# Patient Record
Sex: FEMALE | URBAN - METROPOLITAN AREA
[De-identification: names, ages, dates, MRNs, and addresses within clinical notes are randomized per-mention and may not be internally consistent; named-entity substitution may affect disease eponyms.]

---

## 2024-05-21 ENCOUNTER — OFFICE VISIT (OUTPATIENT)
Dept: URBAN - METROPOLITAN AREA CLINIC 96 | Facility: CLINIC | Age: 39
End: 2024-05-21

## 2024-06-21 ENCOUNTER — OFFICE VISIT (OUTPATIENT)
Dept: URBAN - METROPOLITAN AREA CLINIC 105 | Facility: CLINIC | Age: 39
End: 2024-06-21
Payer: COMMERCIAL

## 2024-06-21 ENCOUNTER — OFFICE VISIT (OUTPATIENT)
Dept: URBAN - METROPOLITAN AREA CLINIC 105 | Facility: CLINIC | Age: 39
End: 2024-06-21

## 2024-06-21 VITALS
BODY MASS INDEX: 23.25 KG/M2 | TEMPERATURE: 97.9 F | HEIGHT: 63 IN | HEART RATE: 55 BPM | SYSTOLIC BLOOD PRESSURE: 126 MMHG | WEIGHT: 131.2 LBS | DIASTOLIC BLOOD PRESSURE: 80 MMHG

## 2024-06-21 DIAGNOSIS — E73.9 LACTOSE INTOLERANCE: ICD-10-CM

## 2024-06-21 DIAGNOSIS — R10.11 RIGHT UPPER QUADRANT PAIN: ICD-10-CM

## 2024-06-21 DIAGNOSIS — R10.13 EPIGASTRIC BURNING SENSATION: ICD-10-CM

## 2024-06-21 DIAGNOSIS — R10.12 LEFT UPPER QUADRANT PAIN: ICD-10-CM

## 2024-06-21 DIAGNOSIS — R11.2 NAUSEA AND VOMITING, UNSPECIFIED VOMITING TYPE: ICD-10-CM

## 2024-06-21 DIAGNOSIS — R14.0 BLOATING: ICD-10-CM

## 2024-06-21 DIAGNOSIS — Z87.19 HISTORY OF CONSTIPATION: ICD-10-CM

## 2024-06-21 DIAGNOSIS — K21.9 GASTROESOPHAGEAL REFLUX DISEASE, UNSPECIFIED WHETHER ESOPHAGITIS PRESENT: ICD-10-CM

## 2024-06-21 PROBLEM — 235595009: Status: ACTIVE | Noted: 2024-06-21

## 2024-06-21 PROCEDURE — 99204 OFFICE O/P NEW MOD 45 MIN: CPT | Performed by: INTERNAL MEDICINE

## 2024-06-21 RX ORDER — OMEPRAZOLE 40 MG/1
1 CAPSULE 30 MINUTES BEFORE MORNING MEAL CAPSULE, DELAYED RELEASE ORAL ONCE A DAY
Qty: 30 | Refills: 2 | OUTPATIENT
Start: 2024-06-21

## 2024-06-21 RX ORDER — ESOMEPRAZOLE MAGNESIUM 20 MG/1
1 CAPSULE CAPSULE, DELAYED RELEASE ORAL ONCE A DAY
Status: ACTIVE | COMMUNITY

## 2024-06-21 RX ORDER — AMITRIPTYLINE HYDROCHLORIDE 10 MG/1
1 TABLET AT BEDTIME TABLET, FILM COATED ORAL ONCE A DAY
Qty: 30 | Refills: 2 | OUTPATIENT
Start: 2024-06-21

## 2024-06-21 RX ORDER — ONDANSETRON 4 MG/1
1 TABLET ON THE TONGUE AND ALLOW TO DISSOLVE TABLET, ORALLY DISINTEGRATING ORAL
Qty: 30 | Refills: 5 | OUTPATIENT
Start: 2024-06-21

## 2024-06-21 RX ORDER — DEXTROAMPHETAMINE SACCHARATE, AMPHETAMINE ASPARTATE MONOHYDRATE, DEXTROAMPHETAMINE SULFATE, AND AMPHETAMINE SULFATE 5; 5; 5; 5 MG/1; MG/1; MG/1; MG/1
TAKE 1 CAPSULE BY MOUTH EVERY MORNING CAPSULE, EXTENDED RELEASE ORAL
Qty: 30 EACH | Refills: 0 | Status: ACTIVE | COMMUNITY

## 2024-06-21 RX ORDER — ELETRIPTAN HYDROBROMIDE 40 MG/1
TAKE 1 TABLET BY MOUTH EVERY DAY AS NEEDED FOR MIGRAINE HEADACHE. MAY REPEAT DOSE ONCE IN HOURS TABLET, FILM COATED ORAL
Qty: 6 EACH | Refills: 0 | Status: ACTIVE | COMMUNITY

## 2024-06-21 RX ORDER — LORATADINE 10 MG
1 PACKET MIXED WITH 8 OUNCES OF FLUID TABLET,DISINTEGRATING ORAL ONCE A DAY
Status: ACTIVE | COMMUNITY

## 2024-06-21 RX ORDER — MAGNESIUM OXIDE/MAG AA CHELATE 300 MG
1 CAPSULE WITH A MEAL CAPSULE ORAL ONCE A DAY
Status: ACTIVE | COMMUNITY

## 2024-06-21 RX ORDER — VALACYCLOVIR 1 G/1
TAKE 2 TABLETS BY MOUTH TWICE A DAY FOR 3 DAYS. THEN AS NEEDED FOR 1 DAY TABLET, FILM COATED ORAL
Qty: 12 EACH | Refills: 0 | Status: ACTIVE | COMMUNITY

## 2024-06-21 RX ORDER — MULTIVITAMIN
1 TABLET TABLET ORAL ONCE A DAY
Status: ACTIVE | COMMUNITY

## 2024-06-21 RX ORDER — DEXTROAMPHETAMINE SACCHARATE, AMPHETAMINE ASPARTATE, DEXTROAMPHETAMINE SULFATE, AND AMPHETAMINE SULFATE 5; 5; 5; 5 MG/1; MG/1; MG/1; MG/1
1 TABLET TABLET ORAL TWICE A DAY
Status: ACTIVE | COMMUNITY

## 2024-06-21 RX ORDER — HYOSCYAMINE SULFATE 0.12 MG/1
1 -2 TABLETS TABLET ORAL
Qty: 30 | Refills: 2 | OUTPATIENT
Start: 2024-06-21 | End: 2024-09-19

## 2024-06-21 NOTE — HPI-TODAY'S VISIT:
Today, the patient presents with a long history of GI symptoms. She's seen two previous GIs - one at [facility] when she was in her mid 20s, then Dr. Singh, but now establishing with me after insurance change. She reports predominantly an epigastric burning sensation and occasional RUQ, LUQ pain. She can wake up in the middle of the night with pain if she does not take TUMS or Nexium prior and can also start having pain after urinating in the middle of the night. She has pain 5 days/week. An issue since she was a child. For the pain, she took Gas-x in the late 90s/early 2000s, has tried limiting diet and alcohol, and takes Nexium and TUMS. She takes Nexium 20 mg at bedtime 5-7 days/week - usually takes it if she's eaten after 6 pm. She has been on Nexium for 2-3 years. The pain can last hours if she does not take something. TUMS occasionally helps. Dr. Singh had prescribed hyoscyamine - unsure whether it helped. She says she can also go weeks without symptoms. She has heartburn 1x/few weeks, but no regurgitation. She also wakes up in the middle of the night w/ hypersalivation & nausea followed by emesis. These nausea/vomiting episodes happen 1x/few weeks, only in the middle of the night. She noted some benefit with ondansetron. She avoids some dairy, does fine w/ yogurt. Also avoids red meat, cauliflower, broccoli. She feels bloated.  She has 1-2 BMs/day w/ good evac. She has a loose BM QAM after coffee. No constipation. She takes Miralax 1 cap QAM, as recommended by Dr. Singh for mild constipation. At the time, she would go 2 days w/o a BM w/ occasional strain, but denies having incomplete evac. She's been on Miralax for 2-4 years.  She followed with Dr. Singh between from 2020 to 2022. The first GI, at [facility], did an EGD/colon when she was age 28/29 - polyp and gastric ulcer found. She also had an EGD with Dr. Singh in Nov 2022 which was normal. Celiac testing done. She says she's been previously dx'd with GERD, IBS, and "unspecified colitis." She had an abdominal ultrasound in 2016 - normal. Last labs were in the Fall.  Other PMH: migraines, ADHD, oral herpes

## 2024-06-22 ENCOUNTER — DASHBOARD ENCOUNTERS (OUTPATIENT)
Age: 39
End: 2024-06-22

## 2024-06-22 PROBLEM — 782415009: Status: ACTIVE | Noted: 2024-06-22

## 2024-06-26 ENCOUNTER — ERX REFILL RESPONSE (OUTPATIENT)
Dept: URBAN - METROPOLITAN AREA CLINIC 105 | Facility: CLINIC | Age: 39
End: 2024-06-26

## 2024-06-26 RX ORDER — HYOSCYAMINE SULFATE 0.12 MG/1
TAKE 1 TO 2 TABLETS ORALLY EVERY 6 HOURS PRN ABDOMINAL PAIN 30 DAYS TABLET ORAL
Qty: 90 TABLET | Refills: 1 | OUTPATIENT

## 2024-06-26 RX ORDER — HYOSCYAMINE SULFATE 0.12 MG/1
1 -2 TABLETS TABLET ORAL
Qty: 30 | Refills: 2 | OUTPATIENT

## 2024-07-12 ENCOUNTER — ERX REFILL RESPONSE (OUTPATIENT)
Dept: URBAN - METROPOLITAN AREA CLINIC 105 | Facility: CLINIC | Age: 39
End: 2024-07-12

## 2024-07-12 RX ORDER — HYOSCYAMINE SULFATE 0.12 MG/1
TAKE 1 TO 2 TABLETS ORALLY EVERY 6 HOURS PRN ABDOMINAL PAIN TABLET ORAL
Qty: 90 | Refills: 1 | OUTPATIENT

## 2024-07-12 RX ORDER — HYOSCYAMINE SULFATE 0.12 MG/1
TAKE 1 TO 2 TABLETS ORALLY EVERY 6 HOURS PRN ABDOMINAL PAIN 30 DAYS TABLET ORAL
Qty: 90 TABLET | Refills: 1 | OUTPATIENT

## 2024-07-17 ENCOUNTER — ERX REFILL RESPONSE (OUTPATIENT)
Dept: URBAN - METROPOLITAN AREA CLINIC 105 | Facility: CLINIC | Age: 39
End: 2024-07-17

## 2024-07-17 RX ORDER — OMEPRAZOLE 40 MG/1
1 CAPSULE 30 MINUTES BEFORE MORNING MEAL CAPSULE, DELAYED RELEASE ORAL ONCE A DAY
Qty: 30 | Refills: 2 | OUTPATIENT

## 2024-07-17 RX ORDER — OMEPRAZOLE 40 MG/1
TAKE 1 CAPSULE BY MOUTH 30 MINUTES BEFORE MORNING MEAL EVERY DAY FOR 30 DAYS CAPSULE, DELAYED RELEASE ORAL
Qty: 90 CAPSULE | Refills: 1 | OUTPATIENT

## 2024-07-17 RX ORDER — AMITRIPTYLINE HYDROCHLORIDE 10 MG/1
TAKE 1 TABLET BY MOUTH EVERY DAY AT BEDTIME FOR 30 DAYS TABLET ORAL
Qty: 90 TABLET | Refills: 1 | OUTPATIENT

## 2024-07-17 RX ORDER — AMITRIPTYLINE HYDROCHLORIDE 10 MG/1
1 TABLET AT BEDTIME TABLET, FILM COATED ORAL ONCE A DAY
Qty: 30 | Refills: 2 | OUTPATIENT

## 2024-08-07 ENCOUNTER — OFFICE VISIT (OUTPATIENT)
Dept: URBAN - METROPOLITAN AREA CLINIC 105 | Facility: CLINIC | Age: 39
End: 2024-08-07
Payer: COMMERCIAL

## 2024-08-07 VITALS
BODY MASS INDEX: 22.68 KG/M2 | DIASTOLIC BLOOD PRESSURE: 77 MMHG | HEART RATE: 82 BPM | TEMPERATURE: 98.2 F | HEIGHT: 63 IN | SYSTOLIC BLOOD PRESSURE: 110 MMHG | WEIGHT: 128 LBS

## 2024-08-07 DIAGNOSIS — R10.11 RIGHT UPPER QUADRANT PAIN: ICD-10-CM

## 2024-08-07 DIAGNOSIS — R10.13 EPIGASTRIC BURNING SENSATION: ICD-10-CM

## 2024-08-07 DIAGNOSIS — Z87.19 HISTORY OF CONSTIPATION: ICD-10-CM

## 2024-08-07 DIAGNOSIS — K21.9 GASTROESOPHAGEAL REFLUX DISEASE, UNSPECIFIED WHETHER ESOPHAGITIS PRESENT: ICD-10-CM

## 2024-08-07 DIAGNOSIS — R10.12 LEFT UPPER QUADRANT PAIN: ICD-10-CM

## 2024-08-07 DIAGNOSIS — E73.9 LACTOSE INTOLERANCE: ICD-10-CM

## 2024-08-07 DIAGNOSIS — R11.2 NAUSEA AND VOMITING, UNSPECIFIED VOMITING TYPE: ICD-10-CM

## 2024-08-07 DIAGNOSIS — R14.0 BLOATING: ICD-10-CM

## 2024-08-07 PROCEDURE — 99214 OFFICE O/P EST MOD 30 MIN: CPT | Performed by: INTERNAL MEDICINE

## 2024-08-07 RX ORDER — MULTIVITAMIN
1 TABLET TABLET ORAL ONCE A DAY
Status: ACTIVE | COMMUNITY

## 2024-08-07 RX ORDER — DEXTROAMPHETAMINE SACCHARATE, AMPHETAMINE ASPARTATE MONOHYDRATE, DEXTROAMPHETAMINE SULFATE, AND AMPHETAMINE SULFATE 5; 5; 5; 5 MG/1; MG/1; MG/1; MG/1
TAKE 1 CAPSULE BY MOUTH EVERY MORNING CAPSULE, EXTENDED RELEASE ORAL
Qty: 30 EACH | Refills: 0 | Status: ACTIVE | COMMUNITY

## 2024-08-07 RX ORDER — HYOSCYAMINE SULFATE 0.12 MG/1
TAKE 1 TO 2 TABLETS ORALLY EVERY 6 HOURS PRN ABDOMINAL PAIN TABLET ORAL
Qty: 90 | Refills: 1 | Status: ACTIVE | COMMUNITY

## 2024-08-07 RX ORDER — OMEPRAZOLE 40 MG/1
TAKE 1 CAPSULE BY MOUTH 30 MINUTES BEFORE MORNING MEAL EVERY DAY CAPSULE, DELAYED RELEASE ORAL
Qty: 90 | Refills: 1 | OUTPATIENT

## 2024-08-07 RX ORDER — AMITRIPTYLINE HYDROCHLORIDE 10 MG/1
TAKE 1 TABLET BY MOUTH EVERY DAY AT BEDTIME FOR 30 DAYS TABLET ORAL
Qty: 90 TABLET | Refills: 1 | Status: ACTIVE | COMMUNITY

## 2024-08-07 RX ORDER — OMEPRAZOLE 40 MG/1
TAKE 1 CAPSULE BY MOUTH 30 MINUTES BEFORE MORNING MEAL EVERY DAY FOR 30 DAYS CAPSULE, DELAYED RELEASE ORAL
Qty: 90 CAPSULE | Refills: 1 | Status: ACTIVE | COMMUNITY

## 2024-08-07 RX ORDER — MAGNESIUM OXIDE/MAG AA CHELATE 300 MG
1 CAPSULE WITH A MEAL CAPSULE ORAL ONCE A DAY
Status: ACTIVE | COMMUNITY

## 2024-08-07 RX ORDER — ONDANSETRON 4 MG/1
1 TABLET ON THE TONGUE AND ALLOW TO DISSOLVE TABLET, ORALLY DISINTEGRATING ORAL
Qty: 30 | Refills: 5 | Status: ACTIVE | COMMUNITY
Start: 2024-06-21

## 2024-08-07 RX ORDER — VALACYCLOVIR 1 G/1
TAKE 2 TABLETS BY MOUTH TWICE A DAY FOR 3 DAYS. THEN AS NEEDED FOR 1 DAY TABLET, FILM COATED ORAL
Qty: 12 EACH | Refills: 0 | Status: ACTIVE | COMMUNITY

## 2024-08-07 RX ORDER — ELETRIPTAN HYDROBROMIDE 40 MG/1
TAKE 1 TABLET BY MOUTH EVERY DAY AS NEEDED FOR MIGRAINE HEADACHE. MAY REPEAT DOSE ONCE IN HOURS TABLET, FILM COATED ORAL
Qty: 6 EACH | Refills: 0 | Status: ACTIVE | COMMUNITY

## 2024-08-07 RX ORDER — AMITRIPTYLINE HYDROCHLORIDE 10 MG/1
TAKE 1 TABLET BY MOUTH EVERY DAY AT BEDTIME TABLET ORAL
Qty: 90 | Refills: 3 | OUTPATIENT

## 2024-08-07 RX ORDER — ESOMEPRAZOLE MAGNESIUM 20 MG/1
1 CAPSULE CAPSULE, DELAYED RELEASE ORAL ONCE A DAY
Status: ON HOLD | COMMUNITY

## 2024-08-07 RX ORDER — DEXTROAMPHETAMINE SACCHARATE, AMPHETAMINE ASPARTATE, DEXTROAMPHETAMINE SULFATE, AND AMPHETAMINE SULFATE 5; 5; 5; 5 MG/1; MG/1; MG/1; MG/1
1 TABLET TABLET ORAL TWICE A DAY
Status: ON HOLD | COMMUNITY

## 2024-08-07 RX ORDER — LORATADINE 10 MG
1 PACKET MIXED WITH 8 OUNCES OF FLUID TABLET,DISINTEGRATING ORAL ONCE A DAY
Status: ACTIVE | COMMUNITY

## 2024-08-08 ENCOUNTER — ERX REFILL RESPONSE (OUTPATIENT)
Dept: URBAN - METROPOLITAN AREA CLINIC 105 | Facility: CLINIC | Age: 39
End: 2024-08-08

## 2024-08-08 RX ORDER — OMEPRAZOLE 40 MG/1
1 CAPSULE 30 MINUTES BEFORE MORNING MEAL CAPSULE, DELAYED RELEASE ORAL ONCE A DAY
Qty: 30 | Refills: 11 | OUTPATIENT

## 2024-08-08 RX ORDER — OMEPRAZOLE 40 MG/1
TAKE 1 CAPSULE BY MOUTH 30 MINUTES BEFORE MORNING MEAL EVERY DAY CAPSULE, DELAYED RELEASE ORAL
Qty: 90 | Refills: 1 | OUTPATIENT

## 2024-08-08 RX ORDER — OMEPRAZOLE 40 MG/1
1 CAPSULE CAPSULE, DELAYED RELEASE ORAL
Qty: 90 | Refills: 3 | OUTPATIENT

## 2024-10-10 ENCOUNTER — ERX REFILL RESPONSE (OUTPATIENT)
Dept: URBAN - METROPOLITAN AREA CLINIC 105 | Facility: CLINIC | Age: 39
End: 2024-10-10

## 2024-10-10 RX ORDER — ONDANSETRON 4 MG/1
1 TABLET ON THE TONGUE AND ALLOW TO DISSOLVE TABLET, ORALLY DISINTEGRATING ORAL
Qty: 30 | Refills: 5 | OUTPATIENT

## 2024-10-10 RX ORDER — AMITRIPTYLINE HYDROCHLORIDE 10 MG/1
TAKE 1 TABLET BY MOUTH EVERY DAY AT BEDTIME TABLET ORAL
Qty: 90 | Refills: 3 | OUTPATIENT

## 2024-10-10 RX ORDER — HYOSCYAMINE SULFATE 0.12 MG/1
TAKE 1 TO 2 TABLETS ORALLY EVERY 6 HOURS PRN ABDOMINAL PAIN TABLET ORAL
Qty: 90 | Refills: 1 | OUTPATIENT

## 2024-11-25 ENCOUNTER — ERX REFILL RESPONSE (OUTPATIENT)
Dept: URBAN - METROPOLITAN AREA CLINIC 105 | Facility: CLINIC | Age: 39
End: 2024-11-25

## 2024-11-25 RX ORDER — HYOSCYAMINE SULFATE 0.12 MG/1
TAKE 1 TO 2 TABLETS ORALLY EVERY 6 HOURS PRN ABDOMINAL PAIN TABLET ORAL
Qty: 90 | Refills: 1 | OUTPATIENT

## 2024-12-04 ENCOUNTER — OFFICE VISIT (OUTPATIENT)
Dept: URBAN - METROPOLITAN AREA CLINIC 105 | Facility: CLINIC | Age: 39
End: 2024-12-04
Payer: COMMERCIAL

## 2024-12-04 VITALS
TEMPERATURE: 97.2 F | BODY MASS INDEX: 23.74 KG/M2 | HEART RATE: 69 BPM | SYSTOLIC BLOOD PRESSURE: 134 MMHG | DIASTOLIC BLOOD PRESSURE: 81 MMHG | HEIGHT: 63 IN | WEIGHT: 134 LBS

## 2024-12-04 DIAGNOSIS — R10.11 RIGHT UPPER QUADRANT PAIN: ICD-10-CM

## 2024-12-04 DIAGNOSIS — R10.12 LEFT UPPER QUADRANT PAIN: ICD-10-CM

## 2024-12-04 DIAGNOSIS — R10.13 EPIGASTRIC BURNING SENSATION: ICD-10-CM

## 2024-12-04 DIAGNOSIS — R11.2 NAUSEA AND VOMITING, UNSPECIFIED VOMITING TYPE: ICD-10-CM

## 2024-12-04 DIAGNOSIS — Z87.19 HISTORY OF CONSTIPATION: ICD-10-CM

## 2024-12-04 DIAGNOSIS — E73.9 LACTOSE INTOLERANCE: ICD-10-CM

## 2024-12-04 PROCEDURE — 99214 OFFICE O/P EST MOD 30 MIN: CPT | Performed by: INTERNAL MEDICINE

## 2024-12-04 RX ORDER — ONDANSETRON 4 MG/1
1 TABLET ON THE TONGUE AND ALLOW TO DISSOLVE TABLET, ORALLY DISINTEGRATING ORAL
Qty: 30 | Refills: 5 | Status: ACTIVE | COMMUNITY

## 2024-12-04 RX ORDER — OMEPRAZOLE 40 MG/1
1 CAPSULE 30 MINUTES BEFORE MORNING MEAL CAPSULE, DELAYED RELEASE ORAL ONCE A DAY
Qty: 30 | Refills: 11 | Status: ACTIVE | COMMUNITY

## 2024-12-04 RX ORDER — VALACYCLOVIR 1 G/1
TAKE 2 TABLETS BY MOUTH TWICE A DAY FOR 3 DAYS. THEN AS NEEDED FOR 1 DAY TABLET, FILM COATED ORAL
Qty: 12 EACH | Refills: 0 | Status: ACTIVE | COMMUNITY

## 2024-12-04 RX ORDER — MULTIVITAMIN
1 TABLET TABLET ORAL ONCE A DAY
Status: ACTIVE | COMMUNITY

## 2024-12-04 RX ORDER — DEXTROAMPHETAMINE SACCHARATE, AMPHETAMINE ASPARTATE, DEXTROAMPHETAMINE SULFATE, AND AMPHETAMINE SULFATE 5; 5; 5; 5 MG/1; MG/1; MG/1; MG/1
1 TABLET TABLET ORAL TWICE A DAY
Status: ON HOLD | COMMUNITY

## 2024-12-04 RX ORDER — MAGNESIUM OXIDE/MAG AA CHELATE 300 MG
1 CAPSULE WITH A MEAL CAPSULE ORAL ONCE A DAY
Status: ACTIVE | COMMUNITY

## 2024-12-04 RX ORDER — HYOSCYAMINE SULFATE 0.12 MG/1
TAKE 1 TO 2 TABLETS ORALLY EVERY 6 HOURS PRN ABDOMINAL PAIN TABLET ORAL
Qty: 90 | Refills: 1 | Status: ACTIVE | COMMUNITY

## 2024-12-04 RX ORDER — PROGESTERONE 100 MG/1
1 CAPSULE AT BEDTIME CAPSULE ORAL ONCE A DAY
Status: ACTIVE | COMMUNITY

## 2024-12-04 RX ORDER — DEXTROAMPHETAMINE SACCHARATE, AMPHETAMINE ASPARTATE MONOHYDRATE, DEXTROAMPHETAMINE SULFATE, AND AMPHETAMINE SULFATE 5; 5; 5; 5 MG/1; MG/1; MG/1; MG/1
TAKE 1 CAPSULE BY MOUTH EVERY MORNING CAPSULE, EXTENDED RELEASE ORAL
Qty: 30 EACH | Refills: 0 | Status: ACTIVE | COMMUNITY

## 2024-12-04 RX ORDER — AMITRIPTYLINE HYDROCHLORIDE 10 MG/1
TAKE 1 TABLET BY MOUTH EVERY DAY AT BEDTIME TABLET ORAL
Qty: 90 | Refills: 3 | Status: ACTIVE | COMMUNITY

## 2024-12-04 RX ORDER — OMEPRAZOLE 40 MG/1
1 CAPSULE ORALLY TWICE A DAY TAKE 30 TO 60 MINS PRIOR TO FIRST AND LAST MEALS CAPSULE, DELAYED RELEASE ORAL
Qty: 180 | Refills: 1 | OUTPATIENT

## 2024-12-04 RX ORDER — SPIRONOLACTONE 100 MG/1
1 TABLET TABLET, FILM COATED ORAL ONCE A DAY
Status: ACTIVE | COMMUNITY

## 2024-12-04 RX ORDER — ELETRIPTAN HYDROBROMIDE 40 MG/1
TAKE 1 TABLET BY MOUTH EVERY DAY AS NEEDED FOR MIGRAINE HEADACHE. MAY REPEAT DOSE ONCE IN HOURS TABLET, FILM COATED ORAL
Qty: 6 EACH | Refills: 0 | Status: ACTIVE | COMMUNITY

## 2024-12-04 RX ORDER — LORATADINE 10 MG
1 PACKET MIXED WITH 8 OUNCES OF FLUID TABLET,DISINTEGRATING ORAL ONCE A DAY
Status: ACTIVE | COMMUNITY

## 2024-12-04 RX ORDER — LISDEXAMFETAMINE DIMESYLATE 30 MG/1
1 CAPSULE IN THE MORNING CAPSULE ORAL ONCE A DAY
Status: ACTIVE | COMMUNITY

## 2024-12-04 RX ORDER — ESTRADIOL 2 MG/1
1 TABLET TABLET ORAL ONCE A DAY
Status: ACTIVE | COMMUNITY

## 2024-12-04 RX ORDER — AMITRIPTYLINE HYDROCHLORIDE 10 MG/1
2 TABLETS TABLET ORAL
Qty: 180 | Refills: 1 | OUTPATIENT

## 2024-12-04 NOTE — HPI-TODAY'S VISIT:
On 6/21/24, the patient presented with a long history of GI symptoms. She had seen two previous GIs - one at [facility] when she was in her mid 20s, then Dr. Singh, but now establishing with me after insurance change. She reported predominantly an epigastric burning sensation and occasional RUQ, LUQ pain. She could wake up in the middle of the night with pain if she did not take TUMS or Nexium prior and could also start having pain after urinating in the middle of the night. She had pain 5 days/week. An issue since she was a child. For the pain, she took Gas-x in the late 90s/early 2000s, had tried limiting diet and alcohol, and took Nexium and TUMS. She took Nexium 20 mg at bedtime 5-7 days/week - usually took it if she had eaten after 6 pm. She had been on Nexium for 2-3 years. The pain could last hours if she did not take something. TUMS occasionally helped. Dr. Singh had prescribed hyoscyamine - unsure whether it helped. She said she could also go weeks without symptoms. She had heartburn 1x/few weeks, but no regurgitation. She also woke up in the middle of the night w/ hypersalivation & nausea followed by emesis. These nausea/vomiting episodes happen 1x/few weeks, only in the middle of the night. She noted some benefit with ondansetron. She avoided some dairy, does fine w/ yogurt. Also avoided red meat, cauliflower, broccoli. She felt bloated.  She had 1-2 BMs/day w/ good evac. She had a loose BM QAM after coffee. No constipation. She took Miralax 1 cap QAM, as recommended by Dr. Singh for mild constipation. At the time, she would go 2 days w/o a BM w/ occasional strain, but denied having incomplete evac. She had been on Miralax for 2-4 years.  She followed with Dr. Singh between from 2020 to 2022. The first GI, at [facility], did an EGD/colon when she was age [age] - polyp and gastric ulcer found. She also had an EGD with Dr. Singh in Nov 2022 which was normal. Celiac testing done. She said she had been previously dx'd with GERD, IBS, and "unspecified colitis." She had an abdominal ultrasound in 2016 - normal. Last labs were in the Fall.  Other PMH: migraines, ADHD, oral herpes  On 8/7/24, she said she noted a significant benefit since starting on omeprazole 40 mg QD - epigastric burning, upper abdominal discomfort better. She also thought amitriptyline helped and helped her sleep. She d/c for a period because she was having nightmares (pt noted having had bad dreams before starting on it). Once she resumed amitriptyline, she starting having "weird dreams," often dreaming about having severe abdominal pain, but said she at least slept through the night without waking up with symptoms. She had not used hyoscyamine. Bowel habit was good - 1 BM/day. She continued to use Miralax.  HPI Today, she says symptoms have worsened - wakes up in middle of the night with nausea and has upper abdominal burning sensation in the morning. She continues on omeprazole 40 mg QAM and amitriptyline 10 mg 1 pill QHS.  Labs 10/19/23 - CBC, CMP, TSH, vit D all normal. 12/2/21 - Fecal fat, fecal calprotectin, fecal elastase, fecal lactoferrin all normal. 11/29/21 - Celiac negative.

## 2024-12-11 ENCOUNTER — ERX REFILL RESPONSE (OUTPATIENT)
Dept: URBAN - METROPOLITAN AREA CLINIC 105 | Facility: CLINIC | Age: 39
End: 2024-12-11

## 2024-12-11 RX ORDER — OMEPRAZOLE 40 MG/1
1 CAPSULE ORALLY TWICE A DAY TAKE 30 TO 60 MINS PRIOR TO FIRST AND LAST MEALS CAPSULE, DELAYED RELEASE ORAL
Qty: 180 | Refills: 1 | OUTPATIENT

## 2025-01-24 ENCOUNTER — OFFICE VISIT (OUTPATIENT)
Dept: URBAN - METROPOLITAN AREA CLINIC 105 | Facility: CLINIC | Age: 40
End: 2025-01-24

## 2025-01-24 RX ORDER — VALACYCLOVIR 1 G/1
TAKE 2 TABLETS BY MOUTH TWICE A DAY FOR 3 DAYS. THEN AS NEEDED FOR 1 DAY TABLET, FILM COATED ORAL
Qty: 12 EACH | Refills: 0 | COMMUNITY

## 2025-01-24 RX ORDER — OMEPRAZOLE 40 MG/1
1 CAPSULE ORALLY TWICE A DAY TAKE 30 TO 60 MINS PRIOR TO FIRST AND LAST MEALS CAPSULE, DELAYED RELEASE ORAL
Qty: 180 | Refills: 1 | COMMUNITY

## 2025-01-24 RX ORDER — HYOSCYAMINE SULFATE 0.12 MG/1
TAKE 1 TO 2 TABLETS ORALLY EVERY 6 HOURS PRN ABDOMINAL PAIN TABLET ORAL
Qty: 90 | Refills: 1 | COMMUNITY

## 2025-01-24 RX ORDER — ESTRADIOL 2 MG/1
1 TABLET TABLET ORAL ONCE A DAY
COMMUNITY

## 2025-01-24 RX ORDER — ONDANSETRON 4 MG/1
1 TABLET ON THE TONGUE AND ALLOW TO DISSOLVE TABLET, ORALLY DISINTEGRATING ORAL
Qty: 30 | Refills: 5 | COMMUNITY

## 2025-01-24 RX ORDER — LORATADINE 10 MG
1 PACKET MIXED WITH 8 OUNCES OF FLUID TABLET,DISINTEGRATING ORAL ONCE A DAY
COMMUNITY

## 2025-01-24 RX ORDER — PROGESTERONE 100 MG/1
1 CAPSULE AT BEDTIME CAPSULE ORAL ONCE A DAY
COMMUNITY

## 2025-01-24 RX ORDER — DEXTROAMPHETAMINE SACCHARATE, AMPHETAMINE ASPARTATE, DEXTROAMPHETAMINE SULFATE, AND AMPHETAMINE SULFATE 5; 5; 5; 5 MG/1; MG/1; MG/1; MG/1
1 TABLET TABLET ORAL TWICE A DAY
Status: ON HOLD | COMMUNITY

## 2025-01-24 RX ORDER — AMITRIPTYLINE HYDROCHLORIDE 10 MG/1
2 TABLETS TABLET ORAL
Qty: 180 | Refills: 1 | COMMUNITY

## 2025-01-24 RX ORDER — MAGNESIUM OXIDE/MAG AA CHELATE 300 MG
1 CAPSULE WITH A MEAL CAPSULE ORAL ONCE A DAY
COMMUNITY

## 2025-01-24 RX ORDER — SPIRONOLACTONE 100 MG/1
1 TABLET TABLET, FILM COATED ORAL ONCE A DAY
COMMUNITY

## 2025-01-24 RX ORDER — MULTIVITAMIN
1 TABLET TABLET ORAL ONCE A DAY
COMMUNITY

## 2025-01-24 RX ORDER — ELETRIPTAN HYDROBROMIDE 40 MG/1
TAKE 1 TABLET BY MOUTH EVERY DAY AS NEEDED FOR MIGRAINE HEADACHE. MAY REPEAT DOSE ONCE IN HOURS TABLET, FILM COATED ORAL
Qty: 6 EACH | Refills: 0 | COMMUNITY

## 2025-01-24 RX ORDER — DEXTROAMPHETAMINE SACCHARATE, AMPHETAMINE ASPARTATE MONOHYDRATE, DEXTROAMPHETAMINE SULFATE, AND AMPHETAMINE SULFATE 5; 5; 5; 5 MG/1; MG/1; MG/1; MG/1
TAKE 1 CAPSULE BY MOUTH EVERY MORNING CAPSULE, EXTENDED RELEASE ORAL
Qty: 30 EACH | Refills: 0 | COMMUNITY

## 2025-01-24 RX ORDER — LISDEXAMFETAMINE DIMESYLATE 30 MG/1
1 CAPSULE IN THE MORNING CAPSULE ORAL ONCE A DAY
COMMUNITY

## 2025-08-16 ENCOUNTER — ERX REFILL RESPONSE (OUTPATIENT)
Dept: URBAN - METROPOLITAN AREA CLINIC 105 | Facility: CLINIC | Age: 40
End: 2025-08-16

## 2025-08-16 RX ORDER — AMITRIPTYLINE HYDROCHLORIDE 10 MG/1
2 TABLETS TABLET ORAL
Qty: 180 | Refills: 1